# Patient Record
Sex: FEMALE | Race: WHITE | NOT HISPANIC OR LATINO | URBAN - METROPOLITAN AREA
[De-identification: names, ages, dates, MRNs, and addresses within clinical notes are randomized per-mention and may not be internally consistent; named-entity substitution may affect disease eponyms.]

---

## 2021-12-23 ENCOUNTER — NEW PATIENT (OUTPATIENT)
Dept: URBAN - METROPOLITAN AREA CLINIC 50 | Facility: CLINIC | Age: 66
End: 2021-12-23

## 2021-12-23 DIAGNOSIS — H25.813: ICD-10-CM

## 2021-12-23 DIAGNOSIS — H35.372: ICD-10-CM

## 2021-12-23 DIAGNOSIS — H47.20: ICD-10-CM

## 2021-12-23 DIAGNOSIS — G35: ICD-10-CM

## 2021-12-23 DIAGNOSIS — H43.813: ICD-10-CM

## 2021-12-23 PROCEDURE — 92004 COMPRE OPH EXAM NEW PT 1/>: CPT

## 2021-12-23 PROCEDURE — 92134 CPTRZ OPH DX IMG PST SGM RTA: CPT

## 2021-12-23 PROCEDURE — 92015 DETERMINE REFRACTIVE STATE: CPT

## 2021-12-23 ASSESSMENT — TONOMETRY
OS_IOP_MMHG: 13
OD_IOP_MMHG: 13

## 2021-12-23 ASSESSMENT — VISUAL ACUITY
OD_SC: J1@14IN
OS_CC: 20/25-2
OS_GLARE: >20/400
OS_CC: J1@14IN
OU_SC: J1+@14IN
OS_SC: J1@14IN
OD_CC: J1+@14IN
OD_CC: 20/30
OD_SC: 20/70-1
OD_PH: 20/25-2
OS_SC: 20/100
OD_GLARE: 20/70
OU_CC: J1+@14IN
OU_SC: 20/80-1
OD_GLARE: 20/40
OU_CC: 20/25

## 2021-12-23 ASSESSMENT — KERATOMETRY
OS_AXISANGLE_DEGREES: 180
OD_AXISANGLE2_DEGREES: 90
OS_K2POWER_DIOPTERS: 42.50
OS_AXISANGLE2_DEGREES: 90
OD_K1POWER_DIOPTERS: 42.50
OD_K2POWER_DIOPTERS: 42.50
OS_K1POWER_DIOPTERS: 42.50
OD_AXISANGLE_DEGREES: 180

## 2022-04-13 ENCOUNTER — DIAGNOSTICS ONLY (OUTPATIENT)
Dept: URBAN - METROPOLITAN AREA CLINIC 50 | Facility: CLINIC | Age: 67
End: 2022-04-13

## 2022-04-13 DIAGNOSIS — H47.20: ICD-10-CM

## 2022-04-13 PROCEDURE — 92133 CPTRZD OPH DX IMG PST SGM ON: CPT

## 2022-04-13 PROCEDURE — 92083 EXTENDED VISUAL FIELD XM: CPT

## 2022-04-13 ASSESSMENT — KERATOMETRY
OS_K1POWER_DIOPTERS: 42.50
OD_AXISANGLE2_DEGREES: 90
OS_AXISANGLE_DEGREES: 180
OD_K1POWER_DIOPTERS: 42.50
OS_AXISANGLE2_DEGREES: 90
OD_AXISANGLE_DEGREES: 180
OD_K2POWER_DIOPTERS: 42.50
OS_K2POWER_DIOPTERS: 42.50

## 2022-04-13 NOTE — PATIENT DISCUSSION
OCT RNFL (04/2022) sup/inf thin OD, inf thin, borderline thin sup OS. HVF (04/2022) edge defects OD, superior defects OS. Will use as baseline and monitor.

## 2023-01-27 ENCOUNTER — COMPREHENSIVE EXAM (OUTPATIENT)
Dept: URBAN - METROPOLITAN AREA CLINIC 50 | Facility: CLINIC | Age: 68
End: 2023-01-27

## 2023-01-27 DIAGNOSIS — H47.20: ICD-10-CM

## 2023-01-27 DIAGNOSIS — H43.813: ICD-10-CM

## 2023-01-27 DIAGNOSIS — H25.813: ICD-10-CM

## 2023-01-27 DIAGNOSIS — H35.372: ICD-10-CM

## 2023-01-27 DIAGNOSIS — H47.323: ICD-10-CM

## 2023-01-27 PROCEDURE — 92133 CPTRZD OPH DX IMG PST SGM ON: CPT

## 2023-01-27 PROCEDURE — 92014 COMPRE OPH EXAM EST PT 1/>: CPT

## 2023-01-27 PROCEDURE — 92015 DETERMINE REFRACTIVE STATE: CPT

## 2023-01-27 ASSESSMENT — VISUAL ACUITY
OD_GLARE: 20/400
OD_CC: 20/30-2
OS_CC: 20/30+1
OU_CC: 20/25-1
OS_GLARE: 20/400
OU_SC: J1+

## 2023-01-27 ASSESSMENT — TONOMETRY
OD_IOP_MMHG: 14
OS_IOP_MMHG: 14

## 2023-01-27 ASSESSMENT — KERATOMETRY
OD_AXISANGLE2_DEGREES: 90
OS_AXISANGLE2_DEGREES: 90
OD_K2POWER_DIOPTERS: 42.50
OD_K1POWER_DIOPTERS: 42.50
OS_AXISANGLE_DEGREES: 180
OS_K1POWER_DIOPTERS: 42.50
OD_AXISANGLE_DEGREES: 180
OS_K2POWER_DIOPTERS: 42.50

## 2023-04-05 ENCOUNTER — SURGERY/PROCEDURE (OUTPATIENT)
Dept: URBAN - METROPOLITAN AREA SURGERY 16 | Facility: SURGERY | Age: 68
End: 2023-04-05

## 2023-04-05 ENCOUNTER — POST-OP (OUTPATIENT)
Dept: URBAN - METROPOLITAN AREA CLINIC 48 | Facility: LOCATION | Age: 68
End: 2023-04-05

## 2023-04-05 DIAGNOSIS — Z96.1: ICD-10-CM

## 2023-04-05 DIAGNOSIS — H25.811: ICD-10-CM

## 2023-04-05 DIAGNOSIS — Z98.41: ICD-10-CM

## 2023-04-05 PROCEDURE — 66984 XCAPSL CTRC RMVL W/O ECP: CPT

## 2023-04-05 ASSESSMENT — KERATOMETRY
OD_AXISANGLE_DEGREES: 15
OD_K1POWER_DIOPTERS: 42.00
OS_AXISANGLE_DEGREES: 100
OS_K2POWER_DIOPTERS: 41.87
OS_K2POWER_DIOPTERS: 41.87
OD_K2POWER_DIOPTERS: 41.62
OD_AXISANGLE_DEGREES: 15
OD_AXISANGLE2_DEGREES: 105
OD_K1POWER_DIOPTERS: 42.00
OD_AXISANGLE2_DEGREES: 105
OS_AXISANGLE2_DEGREES: 10
OS_K1POWER_DIOPTERS: 42.37
OS_AXISANGLE2_DEGREES: 10
OS_AXISANGLE_DEGREES: 100
OS_K1POWER_DIOPTERS: 42.37
OD_K2POWER_DIOPTERS: 41.62

## 2023-04-05 ASSESSMENT — VISUAL ACUITY: OD_SC: 20/80-1

## 2023-04-05 ASSESSMENT — TONOMETRY: OD_IOP_MMHG: 17

## 2023-04-13 ENCOUNTER — POST OP/EVAL OF SECOND EYE (OUTPATIENT)
Dept: URBAN - METROPOLITAN AREA CLINIC 52 | Facility: CLINIC | Age: 68
End: 2023-04-13

## 2023-04-13 DIAGNOSIS — H25.812: ICD-10-CM

## 2023-04-13 DIAGNOSIS — Z98.41: ICD-10-CM

## 2023-04-13 PROCEDURE — 99213 OFFICE O/P EST LOW 20 MIN: CPT

## 2023-04-13 PROCEDURE — 92136 - 2N OPHTHALMIC BIOMETRY BY PARTIAL COHERENCE INTERFEROMETRY WITH INTRAOCULAR LENS POWER CALCULATION

## 2023-04-13 ASSESSMENT — KERATOMETRY
OD_K2POWER_DIOPTERS: 41.62
OS_K1POWER_DIOPTERS: 42.37
OS_K2POWER_DIOPTERS: 41.87
OS_AXISANGLE_DEGREES: 100
OS_AXISANGLE2_DEGREES: 10
OD_AXISANGLE_DEGREES: 15
OD_K1POWER_DIOPTERS: 42.00
OD_AXISANGLE2_DEGREES: 105

## 2023-04-13 ASSESSMENT — VISUAL ACUITY
OD_PH: 20/25
OD_SC: 20/30
OS_CC: 20/30

## 2023-04-13 ASSESSMENT — TONOMETRY
OS_IOP_MMHG: 15
OD_IOP_MMHG: 14

## 2023-04-26 ENCOUNTER — SURGERY/PROCEDURE (OUTPATIENT)
Dept: URBAN - METROPOLITAN AREA SURGERY 16 | Facility: SURGERY | Age: 68
End: 2023-04-26

## 2023-04-26 ENCOUNTER — POST-OP (OUTPATIENT)
Dept: URBAN - METROPOLITAN AREA CLINIC 48 | Facility: LOCATION | Age: 68
End: 2023-04-26

## 2023-04-26 DIAGNOSIS — Z98.42: ICD-10-CM

## 2023-04-26 DIAGNOSIS — H25.812: ICD-10-CM

## 2023-04-26 PROCEDURE — 66984 XCAPSL CTRC RMVL W/O ECP: CPT

## 2023-04-26 ASSESSMENT — KERATOMETRY
OD_AXISANGLE2_DEGREES: 105
OD_K1POWER_DIOPTERS: 42.00
OS_AXISANGLE_DEGREES: 100
OS_AXISANGLE2_DEGREES: 10
OS_K2POWER_DIOPTERS: 41.87
OD_AXISANGLE_DEGREES: 15
OD_AXISANGLE_DEGREES: 15
OS_K1POWER_DIOPTERS: 42.37
OS_K1POWER_DIOPTERS: 42.37
OS_AXISANGLE_DEGREES: 100
OD_K2POWER_DIOPTERS: 41.62
OS_AXISANGLE2_DEGREES: 10
OS_K2POWER_DIOPTERS: 41.87
OD_AXISANGLE2_DEGREES: 105
OD_K2POWER_DIOPTERS: 41.62
OD_K1POWER_DIOPTERS: 42.00

## 2023-04-26 ASSESSMENT — TONOMETRY: OS_IOP_MMHG: 20

## 2023-04-26 ASSESSMENT — VISUAL ACUITY: OS_SC: 20/60

## 2023-05-04 ENCOUNTER — POST-OP (OUTPATIENT)
Dept: URBAN - METROPOLITAN AREA CLINIC 52 | Facility: CLINIC | Age: 68
End: 2023-05-04

## 2023-05-04 DIAGNOSIS — Z98.42: ICD-10-CM

## 2023-05-04 PROCEDURE — 99024 POSTOP FOLLOW-UP VISIT: CPT

## 2023-05-04 ASSESSMENT — TONOMETRY
OS_IOP_MMHG: 12
OD_IOP_MMHG: 11

## 2023-05-04 ASSESSMENT — KERATOMETRY
OD_K1POWER_DIOPTERS: 42.00
OS_AXISANGLE2_DEGREES: 10
OD_AXISANGLE_DEGREES: 15
OS_K2POWER_DIOPTERS: 41.87
OS_K1POWER_DIOPTERS: 42.37
OD_AXISANGLE2_DEGREES: 105
OD_K2POWER_DIOPTERS: 41.62
OS_AXISANGLE_DEGREES: 100

## 2023-05-04 ASSESSMENT — VISUAL ACUITY
OD_PH: 20/25
OD_SC: 20/40-1
OS_SC: 20/30-1
OS_PH: 20/25

## 2023-05-15 ENCOUNTER — POST-OP (OUTPATIENT)
Dept: URBAN - METROPOLITAN AREA CLINIC 53 | Facility: CLINIC | Age: 68
End: 2023-05-15

## 2023-05-15 DIAGNOSIS — H52.13: ICD-10-CM

## 2023-05-15 DIAGNOSIS — Z98.41: ICD-10-CM

## 2023-05-15 DIAGNOSIS — Z98.42: ICD-10-CM

## 2023-05-15 PROCEDURE — 92015 DETERMINE REFRACTIVE STATE: CPT

## 2023-05-15 PROCEDURE — 99024 POSTOP FOLLOW-UP VISIT: CPT

## 2023-05-15 ASSESSMENT — KERATOMETRY
OD_K2POWER_DIOPTERS: 41.62
OS_K2POWER_DIOPTERS: 41.87
OD_AXISANGLE_DEGREES: 15
OS_AXISANGLE2_DEGREES: 10
OS_K1POWER_DIOPTERS: 42.37
OS_AXISANGLE_DEGREES: 100
OD_K1POWER_DIOPTERS: 42.00
OD_AXISANGLE2_DEGREES: 105

## 2023-05-15 ASSESSMENT — TONOMETRY
OD_IOP_MMHG: 12
OS_IOP_MMHG: 15

## 2023-05-15 ASSESSMENT — VISUAL ACUITY
OS_SC: 20/30-1
OS_PH: 20/25
OD_PH: 20/25-1
OD_SC: 20/40

## 2024-03-07 ENCOUNTER — COMPREHENSIVE EXAM (OUTPATIENT)
Dept: URBAN - METROPOLITAN AREA CLINIC 48 | Facility: LOCATION | Age: 69
End: 2024-03-07

## 2024-03-07 DIAGNOSIS — H40.013: ICD-10-CM

## 2024-03-07 DIAGNOSIS — H43.813: ICD-10-CM

## 2024-03-07 DIAGNOSIS — H35.372: ICD-10-CM

## 2024-03-07 DIAGNOSIS — H52.4: ICD-10-CM

## 2024-03-07 DIAGNOSIS — H47.013: ICD-10-CM

## 2024-03-07 PROCEDURE — 92134 CPTRZ OPH DX IMG PST SGM RTA: CPT

## 2024-03-07 PROCEDURE — 92015 DETERMINE REFRACTIVE STATE: CPT

## 2024-03-07 PROCEDURE — 99214 OFFICE O/P EST MOD 30 MIN: CPT

## 2024-03-07 ASSESSMENT — VISUAL ACUITY
OS_GLARE: 20/20
OS_SC: 20/30
OS_GLARE: 20/20
OU_SC: J3@14
OD_GLARE: 20/20
OD_GLARE: 20/20
OS_PH: 20/20
OD_PH: 20/20
OD_SC: 20/30

## 2024-03-07 ASSESSMENT — TONOMETRY
OD_IOP_MMHG: 15
OS_IOP_MMHG: 15

## 2024-03-07 ASSESSMENT — KERATOMETRY
OD_K1POWER_DIOPTERS: 42.25
OD_AXISANGLE2_DEGREES: 90
OD_K2POWER_DIOPTERS: 41.50
OS_K2POWER_DIOPTERS: 42.00
OS_AXISANGLE2_DEGREES: 85
OD_AXISANGLE_DEGREES: 180
OS_K1POWER_DIOPTERS: 42.50
OS_AXISANGLE_DEGREES: 175

## 2024-05-08 ENCOUNTER — DIAGNOSTICS ONLY (OUTPATIENT)
Dept: URBAN - METROPOLITAN AREA CLINIC 48 | Facility: LOCATION | Age: 69
End: 2024-05-08

## 2024-05-08 DIAGNOSIS — H40.013: ICD-10-CM

## 2024-05-08 PROCEDURE — 76514 ECHO EXAM OF EYE THICKNESS: CPT

## 2024-05-08 PROCEDURE — 92083 EXTENDED VISUAL FIELD XM: CPT

## 2024-05-08 PROCEDURE — 92133 CPTRZD OPH DX IMG PST SGM ON: CPT

## 2024-05-08 ASSESSMENT — KERATOMETRY
OD_K1POWER_DIOPTERS: 42.25
OD_AXISANGLE2_DEGREES: 90
OS_K1POWER_DIOPTERS: 42.50
OS_AXISANGLE_DEGREES: 175
OD_K2POWER_DIOPTERS: 41.50
OS_K2POWER_DIOPTERS: 42.00
OD_AXISANGLE_DEGREES: 180
OS_AXISANGLE2_DEGREES: 85

## 2024-05-08 ASSESSMENT — PACHYMETRY
OS_CT_UM: 588
OD_CT_UM: 619